# Patient Record
Sex: FEMALE | Race: WHITE | NOT HISPANIC OR LATINO | Employment: UNEMPLOYED | URBAN - METROPOLITAN AREA
[De-identification: names, ages, dates, MRNs, and addresses within clinical notes are randomized per-mention and may not be internally consistent; named-entity substitution may affect disease eponyms.]

---

## 2022-08-31 ENCOUNTER — NURSE TRIAGE (OUTPATIENT)
Dept: ADMINISTRATIVE | Facility: CLINIC | Age: 37
End: 2022-08-31

## 2022-09-01 NOTE — TELEPHONE ENCOUNTER
Attempted to call x 2 no answer.  LVM to call back if further assistance is needed.    Reason for Disposition   No answer.  First attempt to contact caller.  Follow-up call scheduled within 15 minutes.    Protocols used: No Contact or Duplicate Contact Call-A-AH

## 2024-09-05 ENCOUNTER — HOSPITAL ENCOUNTER (EMERGENCY)
Facility: HOSPITAL | Age: 39
Discharge: HOME | End: 2024-09-05
Attending: SURGERY
Payer: MEDICAID

## 2024-09-05 VITALS
RESPIRATION RATE: 16 BRPM | SYSTOLIC BLOOD PRESSURE: 127 MMHG | DIASTOLIC BLOOD PRESSURE: 84 MMHG | HEART RATE: 112 BPM | WEIGHT: 119.25 LBS | BODY MASS INDEX: 20.36 KG/M2 | TEMPERATURE: 98 F | HEIGHT: 64 IN | OXYGEN SATURATION: 97 %
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
## 2024-09-05 DIAGNOSIS — S00.33XA CONTUSION OF NOSE, INITIAL ENCOUNTER: Primary | ICD-10-CM

## 2024-09-05 PROCEDURE — 25000003 PHARM REV CODE 250: Performed by: NURSE PRACTITIONER

## 2024-09-05 PROCEDURE — 99284 EMERGENCY DEPT VISIT MOD MDM: CPT | Mod: 25

## 2024-09-05 RX ORDER — IBUPROFEN 800 MG/1
800 TABLET ORAL
Status: COMPLETED | OUTPATIENT
Start: 2024-09-05 | End: 2024-09-05

## 2024-09-05 RX ORDER — IBUPROFEN 800 MG/1
800 TABLET ORAL EVERY 8 HOURS PRN
Qty: 20 TABLET | Refills: 0 | Status: SHIPPED | OUTPATIENT
Start: 2024-09-05

## 2024-09-05 RX ADMIN — IBUPROFEN 800 MG: 800 TABLET, FILM COATED ORAL at 08:09
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
